# Patient Record
(demographics unavailable — no encounter records)

---

## 2025-02-06 NOTE — PROCEDURE
[Endometrial Biopsy] : Endometrial biopsy [Consent Obtained] : Consent obtained [Post-Menop. Bleeding] : post-menopausal bleeding [Betadine] : Betadine [Tenaculum] : Tenaculum [Required Dilation] : required dilation [Scant] : scant [Tolerated Well] : Patient tolerated the procedure well [No Complications] : No complications [de-identified] : unsure if was able to get past the internal os

## 2025-02-06 NOTE — PROCEDURE
[Endometrial Biopsy] : Endometrial biopsy [Consent Obtained] : Consent obtained [Post-Menop. Bleeding] : post-menopausal bleeding [Betadine] : Betadine [Tenaculum] : Tenaculum [Required Dilation] : required dilation [Scant] : scant [Tolerated Well] : Patient tolerated the procedure well [No Complications] : No complications [de-identified] : unsure if was able to get past the internal os

## 2025-02-06 NOTE — PHYSICAL EXAM
[TextEntry] : Genitourinary: No abnormalities of the external genitalia, vagina, cervix and uterus. The adnexa were not tender and not enlarged.

## 2025-02-06 NOTE — HISTORY OF PRESENT ILLNESS
[FreeTextEntry1] : 57 yo  presents for initial exam and PMB. LMP . She had PMB starting 1/2023 x1 episode. Next episode  and at that time she had several episodes and had to have an EMB and was told it was inconclusive or insufficient. She had a TVS  which showed a thickened endometrium 8mm. She had a repeat TVS  normal endometrial thickness 4.8mm. She reports that her last episode of PMB was  and very heavy x10d. Hx in  for lymphoma for which she received chemotherapy. In  she had thyroid cancer for which she needed a hemithyroidectomy only w/ no radioactive iodine or chemotherapy needed. In  she had breast ca stage 1 for which she had lumpectomy and radiation. Last pap  Last colonoscopy -> +polyps was advised to repeat in 5 years She is UTD w/ breast imaging and follows at [facility]. OB hx: C/S x1  x1

## 2025-02-06 NOTE — HISTORY OF PRESENT ILLNESS
[FreeTextEntry1] : 55 yo  presents for initial exam and PMB. LMP . She had PMB starting 1/2023 x1 episode. Next episode  and at that time she had several episodes and had to have an EMB and was told it was inconclusive or insufficient. She had a TVS  which showed a thickened endometrium 8mm. She had a repeat TVS  normal endometrial thickness 4.8mm. She reports that her last episode of PMB was  and very heavy x10d. Hx in  for lymphoma for which she received chemotherapy. In  she had thyroid cancer for which she needed a hemithyroidectomy only w/ no radioactive iodine or chemotherapy needed. In  she had breast ca stage 1 for which she had lumpectomy and radiation. Last pap  Last colonoscopy -> +polyps was advised to repeat in 5 years She is UTD w/ breast imaging and follows at [facility]. OB hx: C/S x1  x1

## 2025-02-06 NOTE — END OF VISIT
[TextEntry] : IKaylah, am scribing for and the presence of Dr. Abena Stinson the following sections HISTORY OF PRESENT ILLNESS, PAST MEDICAL/FAMILY/SOCIAL HISTORY; REVIEW OF SYSTEMS; PHYSICAL EXAM; DISPOSITION.

## 2025-03-12 NOTE — HISTORY OF PRESENT ILLNESS
[de-identified] : 57 yo s/p d+c hysteroscopy for large endometrial polyp. PATH-> benign fragments of endometrial polyp with focal crowding w/o atypia. No hyperplasia. Pt. w/o complaints.  [de-identified] : Genitourinary: No abnormalities of the external genitalia, vagina, cervix and uterus. The adnexa were not tender and not enlarged.

## 2025-05-21 NOTE — ASSESSMENT
[FreeTextEntry1] : Patient feeling somewhat better but still has discomfort.  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   She is scheduled for surgery on 5/30/2025.  Indications, risks, benefits, alternatives for a laparoscopic hybrid anterior resection with en bloc KAYDEN/BSO by GYN oncology reviewed including but not limited to bleeding, infection, recurrence, anastomotic leak, ostomy, intraoperative cystoscopy with ureteral catheters, and change in bowel habits.  All questions were answered.

## 2025-05-21 NOTE — HISTORY OF PRESENT ILLNESS
[FreeTextEntry1] : Hannah is a 55 y/o female here for a follow up TELE visit, discuss before sx.   Hospitalized on 5/14/25 -5/17/25 due to complicated diverticulitis. CT with colouterine fistula. Home on Invanz  Colonoscopy on 11/27/24 - Polyps in the rectum. (Polypectomy, Biopsy). Severe diverticulosis of the sigmoid colon.   CT A+P on 8/29/24 - 1. Since May 1, 2023, decreased previously prominent pelvic lymph nodes. 2. Thickening of the sigmoid colon with slight adjacent fat stranding, possibly mild diverticulitis, suggest follow-up to ensure resolution.   Today pt still has some LLQ abdominal discomfort - takes Tylenol. Denies nausea/vomiting. Denies fevers//chills. Improving appetite. Daily 4-5 times daily, loose. Still taking IV antibiotics. Has had about 4 episodes of diverticultitis over the last 4 months.

## 2025-05-21 NOTE — HISTORY OF PRESENT ILLNESS
[FreeTextEntry1] : Hannah is a 57 y/o female here for a follow up TELE visit, discuss before sx.   Hospitalized on 5/14/25 -5/17/25 due to complicated diverticulitis. CT with colouterine fistula. Home on Invanz  Colonoscopy on 11/27/24 - Polyps in the rectum. (Polypectomy, Biopsy). Severe diverticulosis of the sigmoid colon.   CT A+P on 8/29/24 - 1. Since May 1, 2023, decreased previously prominent pelvic lymph nodes. 2. Thickening of the sigmoid colon with slight adjacent fat stranding, possibly mild diverticulitis, suggest follow-up to ensure resolution.   Today pt still has some LLQ abdominal discomfort - takes Tylenol. Denies nausea/vomiting. Denies fevers//chills. Improving appetite. Daily 4-5 times daily, loose. Still taking IV antibiotics. Has had about 4 episodes of diverticultitis over the last 4 months.

## 2025-06-12 NOTE — PHYSICAL EXAM
[Abdomen Masses] : No abdominal masses [Abdomen Tenderness] : ~T No ~M abdominal tenderness [de-identified] : Normal wound healing

## 2025-06-12 NOTE — HISTORY OF PRESENT ILLNESS
[FreeTextEntry1] : Hannah is a 57 y/o female being seen for a post-op visit, s/p Laparoscopic hybrid anterior resection on 5/30/25 for smoldering diverticulitis with colouterine fistula Pathology: 1. Omentum; resection: - Fibroadipose tissue with inflammation and endometriosis. 2. Fallopian tube and ovary, right; salpingo-oophorectomy: - Follicle cyst. - Benign fallopian tube. 3. Uterus, cervix, left fallopian tube and ovary; hysterectomy and salpingo-oophorectomy: - Cervix and lower uterine segment with abscess formation. - Adenomyosis. - Leiomyoma. - Inactive endometrium. - Benign fallopian tube and ovary. 4. Sigmoid colon with fistula; resection: -  Segment of colon with diverticulosis, acute diverticulitis and mural abscess formation.      - Resection margins are viable. 6. Proximal and distal donuts; resection: - Benign viable colonic tissue.  Colonoscopy on 11/27/24 - Polyps in the rectum. (Polypectomy, Biopsy). Severe diverticulosis of the sigmoid colon.  Today patient reports very minimal surgical incisional pain.  Soft BMs less frequent once daily now, no bleeding.  Good appetite.  Denies nausea or vomiting.  No fever or chills.  Surgical incision with steri strips, no redness, swelling, or drainage from the incision sites.  No incontinence of stool or flatus.

## 2025-06-12 NOTE — HISTORY OF PRESENT ILLNESS
[FreeTextEntry1] : Hannah is a 55 y/o female being seen for a post-op visit, s/p Laparoscopic hybrid anterior resection on 5/30/25 for smoldering diverticulitis with colouterine fistula Pathology: 1. Omentum; resection: - Fibroadipose tissue with inflammation and endometriosis. 2. Fallopian tube and ovary, right; salpingo-oophorectomy: - Follicle cyst. - Benign fallopian tube. 3. Uterus, cervix, left fallopian tube and ovary; hysterectomy and salpingo-oophorectomy: - Cervix and lower uterine segment with abscess formation. - Adenomyosis. - Leiomyoma. - Inactive endometrium. - Benign fallopian tube and ovary. 4. Sigmoid colon with fistula; resection: -  Segment of colon with diverticulosis, acute diverticulitis and mural abscess formation.      - Resection margins are viable. 6. Proximal and distal donuts; resection: - Benign viable colonic tissue.  Colonoscopy on 11/27/24 - Polyps in the rectum. (Polypectomy, Biopsy). Severe diverticulosis of the sigmoid colon.  Today patient reports very minimal surgical incisional pain.  Soft BMs less frequent once daily now, no bleeding.  Good appetite.  Denies nausea or vomiting.  No fever or chills.  Surgical incision with steri strips, no redness, swelling, or drainage from the incision sites.  No incontinence of stool or flatus.

## 2025-06-12 NOTE — PHYSICAL EXAM
[Abdomen Masses] : No abdominal masses [Abdomen Tenderness] : ~T No ~M abdominal tenderness [de-identified] : Normal wound healing

## 2025-07-02 NOTE — HISTORY OF PRESENT ILLNESS
[FreeTextEntry1] : Hannah is a 57 y/o female being seen for a post-op visit, s/p Laparoscopic hybrid anterior resection on 5/30/25 for smoldering diverticulitis with colouterine fistula Pathology: 1. Omentum; resection: - Fibroadipose tissue with inflammation and endometriosis. 2. Fallopian tube and ovary, right; salpingo-oophorectomy: - Follicle cyst. - Benign fallopian tube. 3. Uterus, cervix, left fallopian tube and ovary; hysterectomy and salpingo-oophorectomy: - Cervix and lower uterine segment with abscess formation. - Adenomyosis. - Leiomyoma. - Inactive endometrium. - Benign fallopian tube and ovary. 4. Sigmoid colon with fistula; resection: - Segment of colon with diverticulosis, acute diverticulitis and mural abscess formation.  - Resection margins are viable. 6. Proximal and distal donuts; resection: - Benign viable colonic tissue.  Colonoscopy on 11/27/24 - Polyps in the rectum. (Polypectomy, Biopsy). Severe diverticulosis of the sigmoid colon.  Last seen on 6/12/25 - Doing well. Advance diet as tolerated. Follow-up 4 to 6 weeks.

## 2025-07-02 NOTE — PHYSICAL EXAM
[Abdomen Masses] : No abdominal masses [Abdomen Tenderness] : ~T No ~M abdominal tenderness [de-identified] : Normal wound healing.

## 2025-07-02 NOTE — HISTORY OF PRESENT ILLNESS
[FreeTextEntry1] : Hannah is a 55 y/o female being seen for a post-op visit, s/p Laparoscopic hybrid anterior resection on 5/30/25 for smoldering diverticulitis with colouterine fistula Pathology: 1. Omentum; resection: - Fibroadipose tissue with inflammation and endometriosis. 2. Fallopian tube and ovary, right; salpingo-oophorectomy: - Follicle cyst. - Benign fallopian tube. 3. Uterus, cervix, left fallopian tube and ovary; hysterectomy and salpingo-oophorectomy: - Cervix and lower uterine segment with abscess formation. - Adenomyosis. - Leiomyoma. - Inactive endometrium. - Benign fallopian tube and ovary. 4. Sigmoid colon with fistula; resection: - Segment of colon with diverticulosis, acute diverticulitis and mural abscess formation.  - Resection margins are viable. 6. Proximal and distal donuts; resection: - Benign viable colonic tissue.  Colonoscopy on 11/27/24 - Polyps in the rectum. (Polypectomy, Biopsy). Severe diverticulosis of the sigmoid colon.  Last seen on 6/12/25 - Doing well. Advance diet as tolerated. Follow-up 4 to 6 weeks.

## 2025-07-02 NOTE — PHYSICAL EXAM
[Abdomen Masses] : No abdominal masses [Abdomen Tenderness] : ~T No ~M abdominal tenderness [de-identified] : Normal wound healing.

## 2025-07-03 NOTE — ASSESSMENT
[FreeTextEntry1] : Vaginal irritation noted on exam. Discussed use of steroid ointment to affected area. Script sent to pharmacy on file. Reviewed vulvar hygiene recommendations and avoiding potential irritants. Will call pt regarding results from BV panel and urine studies.

## 2025-07-03 NOTE — HISTORY OF PRESENT ILLNESS
[FreeTextEntry1] : KAYDEN and BSO at time of colectomy for perforated diverticulitis on 5/30/25 Benign pathology results.  Seen for well POA on 6/17. Pt instructed to FU with primary GYN for ongoing well woman care.  Pt presents today for evaluation of vaginal pruritus and burning for 5 weeks since surgery in May. Pt is traveling on Sunday and requested urgent evaluation. Denies vaginal bleeding, discharge, pelvic pain, or discomfort. Reports burning and discomfort with urination.  PCP performed UA and UCx 1 week ago, both negative per pt report. Pt requesting repeat testing today.
